# Patient Record
Sex: MALE | Race: WHITE | NOT HISPANIC OR LATINO | URBAN - METROPOLITAN AREA
[De-identification: names, ages, dates, MRNs, and addresses within clinical notes are randomized per-mention and may not be internally consistent; named-entity substitution may affect disease eponyms.]

---

## 2024-03-24 ENCOUNTER — EMERGENCY (EMERGENCY)
Facility: HOSPITAL | Age: 34
LOS: 1 days | Discharge: HOME | End: 2024-03-24
Attending: STUDENT IN AN ORGANIZED HEALTH CARE EDUCATION/TRAINING PROGRAM | Admitting: STUDENT IN AN ORGANIZED HEALTH CARE EDUCATION/TRAINING PROGRAM
Payer: COMMERCIAL

## 2024-03-24 VITALS
SYSTOLIC BLOOD PRESSURE: 159 MMHG | TEMPERATURE: 98 F | OXYGEN SATURATION: 100 % | WEIGHT: 179.9 LBS | RESPIRATION RATE: 18 BRPM | HEART RATE: 64 BPM | DIASTOLIC BLOOD PRESSURE: 85 MMHG | HEIGHT: 70 IN

## 2024-03-24 DIAGNOSIS — S90.121A CONTUSION OF RIGHT LESSER TOE(S) WITHOUT DAMAGE TO NAIL, INITIAL ENCOUNTER: ICD-10-CM

## 2024-03-24 DIAGNOSIS — M79.642 PAIN IN LEFT HAND: ICD-10-CM
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
## 2024-03-24 DIAGNOSIS — M79.89 OTHER SPECIFIED SOFT TISSUE DISORDERS: ICD-10-CM

## 2024-03-24 DIAGNOSIS — G89.11 ACUTE PAIN DUE TO TRAUMA: ICD-10-CM
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
## 2024-03-24 PROCEDURE — 73110 X-RAY EXAM OF WRIST: CPT | Mod: 26,LT

## 2024-03-24 PROCEDURE — 99284 EMERGENCY DEPT VISIT MOD MDM: CPT

## 2024-03-24 PROCEDURE — 73110 X-RAY EXAM OF WRIST: CPT

## 2024-03-24 PROCEDURE — 73130 X-RAY EXAM OF HAND: CPT

## 2024-03-24 PROCEDURE — 73620 X-RAY EXAM OF FOOT: CPT | Mod: 26,RT

## 2024-03-24 PROCEDURE — 99284 EMERGENCY DEPT VISIT MOD MDM: CPT | Mod: 25

## 2024-03-24 PROCEDURE — 73620 X-RAY EXAM OF FOOT: CPT

## 2024-03-24 PROCEDURE — 73130 X-RAY EXAM OF HAND: CPT | Mod: 26,LT

## 2024-03-24 NOTE — ED PROVIDER NOTE - PHYSICAL EXAMINATION
general: Well appearing, in no acute distress  HEENT: Normocephalic, atraumatic, extraocular movements intact  CV: Regular rate  Pulm: No respiratory distress, no tachypnea  Abd: Flat, no gross distension  Ext: warm and well perfused, significant swelling of left hand with tenderness along dorsal aspect of hand,  right second toe with ecchymosis and bruising  Skin: No gross rashes or lesions  Neuro: Alert and oriented, moving all extremities

## 2024-03-24 NOTE — ED PROVIDER NOTE - NS ED ROS FT
Constitutional: No fever or chills  Eyes: No discharge or drainage  Ears, Nose, Mouth, Throat: No nasal discharge, no sore throat  Cardiovascular: No chest pain, no palpitations  Respiratory: No shortness of breath, no cough  Gastrointestinal: No nausea or vomiting, no abdominal pain, no diarrhea or constipation  Musculoskeletal: +joint pain, +swelling  Skin: No rashes or lesions  Neurological: No numbness, weakness, tingling, no headache

## 2024-03-24 NOTE — ED PROVIDER NOTE - PATIENT PORTAL LINK FT
You can access the FollowMyHealth Patient Portal offered by [facility] by registering at the following website: [email or web address]. By joining "[employer]’s FollowMyHealth portal, you will also be able to view your health information using other applications (apps) compatible with our system.

## 2024-03-24 NOTE — ED PROVIDER NOTE - NSFOLLOWUPINSTRUCTIONS_ED_ALL_ED_FT
You were seen in the Emergency Department for hand and foot pain after a fall. You had XRAYs done and were given a pain medication. Please take tylenol or motrin as needed. Please apply ice to the areas of pain. You should also elevate them to help with swelling. Return for any worsening symptoms, worsening pain. Otherwise, please follow up with your primary physician.    I hope you feel better soon!    Sincerely,  Jesus Lynne MD

## 2024-03-24 NOTE — ED ADULT NURSE NOTE - OBJECTIVE STATEMENT
Presents for c/o pain to L hand and R foot s/p mechanical fall onto hands and knees last night, denies head strike/loc/ac use.     On assessment- AOx4, breathing even and unlabored on RA, no apparent distress, VSS in triage, able to speak in clear coherent sentences, steady gait unassisted, neuro intact with no apparent facial asymmetry, PERRLA.

## 2024-03-24 NOTE — ED ADULT NURSE NOTE - NSFALLUNIVINTERV_ED_ALL_ED
Bed/Stretcher in lowest position, wheels locked, appropriate side rails in place/Call bell, personal items and telephone in reach/Instruct patient to call for assistance before getting out of bed/chair/stretcher/Non-slip footwear applied when patient is off stretcher/[place] to call system/Physically safe environment - no spills, clutter or unnecessary equipment/Purposeful proactive rounding/Room/bathroom lighting operational, light cord in reach

## 2024-03-24 NOTE — ED PROVIDER NOTE - OBJECTIVE STATEMENT
34-year-old male no significant past medical history presenting after fall yesterday.  Patient was standing on bed to try to fix the leak on the ceiling, fell onto left hand and right second toe.  No head trauma or LOC.  Patient with swelling and pain along areas of traumatic impact.  Denies numbness or tingling, not on AC, no other acute complaints.   ROS as above.

## 2024-03-24 NOTE — ED PROVIDER NOTE - CLINICAL SUMMARY MEDICAL DECISION MAKING FREE TEXT BOX
34-year-old male presenting after fall yesterday, swelling of left hand and right second toe, will obtain x-rays rule out fracture, pain control, reassess.

## 2025-06-20 NOTE — ED ADULT NURSE NOTE - NS ED NURSE RECORD ANOTHER HT AND WT
"Device clinic notified me 2 days ago about \"AF EPISODE ON 3/14/25 THAT LASTED 4.29 hrs, NO OAC NOTED. NO EPISODES SINCE. DID HAVE AN AF ABLATION BACK IN 3/2018.\"    Review of his Cardiac Compass does not show any events prior or after since May 2024-Now. We will monitor for more often or longer events prior to starting OAC.  "
Yes